# Patient Record
Sex: FEMALE | Race: WHITE | NOT HISPANIC OR LATINO | URBAN - METROPOLITAN AREA
[De-identification: names, ages, dates, MRNs, and addresses within clinical notes are randomized per-mention and may not be internally consistent; named-entity substitution may affect disease eponyms.]

---

## 2022-06-04 ENCOUNTER — EMERGENCY (EMERGENCY)
Facility: HOSPITAL | Age: 22
LOS: 1 days | Discharge: HOME | End: 2022-06-04
Admitting: EMERGENCY MEDICINE
Payer: MEDICAID

## 2022-06-04 VITALS
HEIGHT: 72 IN | OXYGEN SATURATION: 98 % | HEART RATE: 100 BPM | SYSTOLIC BLOOD PRESSURE: 116 MMHG | DIASTOLIC BLOOD PRESSURE: 70 MMHG | RESPIRATION RATE: 18 BRPM | WEIGHT: 175.05 LBS | TEMPERATURE: 98 F

## 2022-06-04 PROCEDURE — 73130 X-RAY EXAM OF HAND: CPT | Mod: 26,LT

## 2022-06-04 PROCEDURE — 73130 X-RAY EXAM OF HAND: CPT | Mod: 26

## 2022-06-04 PROCEDURE — 99284 EMERGENCY DEPT VISIT MOD MDM: CPT | Mod: 25

## 2022-06-04 PROCEDURE — 73110 X-RAY EXAM OF WRIST: CPT | Mod: 26,LT

## 2022-06-04 PROCEDURE — 73110 X-RAY EXAM OF WRIST: CPT

## 2022-06-04 PROCEDURE — 73110 X-RAY EXAM OF WRIST: CPT | Mod: 26

## 2022-06-04 PROCEDURE — 99283 EMERGENCY DEPT VISIT LOW MDM: CPT | Mod: 25

## 2022-06-04 PROCEDURE — 73130 X-RAY EXAM OF HAND: CPT

## 2022-06-04 NOTE — ED PROVIDER NOTE - PATIENT PORTAL LINK FT
You can access the FollowMyHealth Patient Portal offered by [facility] by registering at the following website: [email or web address]. By joining [employer]’s FollowMyHealth portal, you will also be able to view your health information using other applications (apps) compatible with our system.

## 2022-06-04 NOTE — ED ADULT NURSE NOTE - CHIEF COMPLAINT QUOTE
x 2 weeks ago fell off skateboard. L wrist injury, seen at urgent care had XR . no abnormal findings. came in due to continued wrist pain. Troponin level elevated

## 2022-06-04 NOTE — ED ADULT TRIAGE NOTE - CHIEF COMPLAINT QUOTE
x 2 weeks ago fell off skateboard. L wrist injury, seen at urgent care had XR . no abnormal findings. came in due to continued wrist pain.

## 2022-06-04 NOTE — ED PROVIDER NOTE - CLINICAL SUMMARY MEDICAL DECISION MAKING FREE TEXT BOX
22 transgender M to F p/w L wrist pain s/p injury 2 weeks ago.  on exam LUE: mild swelling and ttp over distal radius and snuff box, no gross deformity, pain w/ ROM wrist but not limited, SILT, cap refill < 2 sec, radial pulse 2+.  will obtain xrays and likely dc w/ thumb spica and hand f/u outpt.

## 2022-06-04 NOTE — ED ADULT NURSE NOTE - NS_SISCREENINGSR_GEN_ALL_ED
Bedside report received from Beverly FRASER. Assumed care. POC discussed. Pt resting comfortably in bed. Safety precautions in place.     Negative

## 2022-06-04 NOTE — ED PROVIDER NOTE - PHYSICAL EXAMINATION
Gen: well appearing, no acute distress  Skin: warm/dry, no rash noted  Resp: breathing comfortably, speaking in full sentences, no dyspnea  LUE: mild swelling and ttp over distal radius and snuff box, no gross deformity, pain w/ ROM wrist but not limited, SILT, cap refill < 2 sec, radial pulse 2+  Neuro: alert/oriented, ambulatory

## 2022-06-04 NOTE — ED PROVIDER NOTE - NSFOLLOWUPINSTRUCTIONS_ED_ALL_ED_FT
Take tylenol 650mg or motrin 400-800mg as needed every 4-6 hours for pain.   REST- Rest your hurting/injured joint or extremity to decrease pain and swelling for 24-48 hours    ICE- Apply ice to area of pain to decreased inflammation and pain, put towel/barrier between ice and skin. You can keep ice on for 20 minutes at a time 4-8 times daily   COMPRESSION- Wear ace wrap or brace for support to reduce swelling.  Make sure not to wrap too tight, loosen if skin feeling numb/tingling or skin turns blue   ELEVATION- Elevate hurting/injured area 6 or more inches about level of heart to decrease swelling/inflammation.  Use pillow under joint to elevate area Take tylenol 650mg or motrin 400-800mg as needed every 4-6 hours for pain.   REST- Rest your hurting/injured joint or extremity to decrease pain and swelling for 24-48 hours    ICE- Apply ice to area of pain to decreased inflammation and pain, put towel/barrier between ice and skin. You can keep ice on for 20 minutes at a time 4-8 times daily   COMPRESSION- Wear ace wrap or brace for support to reduce swelling.  Make sure not to wrap too tight, loosen if skin feeling numb/tingling or skin turns blue   ELEVATION- Elevate hurting/injured area 6 or more inches about level of heart to decrease swelling/inflammation.  Use pillow under joint to elevate area    Wear splint as instructed  Call to arrange follow up with hand specialist within one week   [facility] (Thursday afternoons) - call [phone] to schedule  You may call our referrals coordinator at [phone] Monday to Friday 11am-7pm for assistance with making an appointment

## 2022-06-04 NOTE — ED ADULT NURSE NOTE - OBJECTIVE STATEMENT
Patient c/o of left wrist/hand pain, states fell off skateboard 2 weeks ago, used left outstretched hand to break fall, denies hitting head or LOC.  Was seen at  and Xray done, not conclusive if fractured.  States still with pain, noted hematoma , FROM to all fingers, good neurovascular s/s left hand.  Took Alleve 1 hour ago with relief of pain.

## 2022-06-04 NOTE — ED PROVIDER NOTE - OBJECTIVE STATEMENT
22 transgender M to F p/w L wrist pain s/p injury 2 weeks ago.  pt reports dull aching pain in L radial aspect, worse w/ ROM wrist and 1st digit.  went to UC after initial injury and reports told xrays show possible fracture but unsure.  told to have repeat xrays if pain persists over another week which is why pt here today.  denies f/c, skin changes, numbness/weakness, paresthesia, other injuires, swelling arm, chest pain sob.

## 2022-06-04 NOTE — ED PROVIDER NOTE - CARE PROVIDER_API CALL
Enrike Tian)  [facility]  [address] [place] [address]  Phone: [phone]  Fax: [phone])-  Follow Up Time:

## 2022-06-08 DIAGNOSIS — M79.89 OTHER SPECIFIED SOFT TISSUE DISORDERS: ICD-10-CM
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
## 2022-06-08 DIAGNOSIS — Z88.8 ALLERGY STATUS TO OTHER DRUGS, MEDICAMENTS AND BIOLOGICAL SUBSTANCES STATUS: ICD-10-CM
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
## 2022-06-08 DIAGNOSIS — M25.532 PAIN IN LEFT WRIST: ICD-10-CM
